# Patient Record
(demographics unavailable — no encounter records)

---

## 2020-12-03 NOTE — NUR
SUICIDAL IDEATION, THINKING OF OVERDOSING ON HEROIN, [place]S TO GO TO [place]. [place]. PATIENT A/OX4, AMBULATORY WITH STEADY GAIT. NO DISTRESS NOTED.

## 2020-12-03 NOTE — NUR
PATIENT A/OX4, REFUSED TO BE ADMITTED FOR SUICIDAL IDEATION, DENIES SI/HI AT 
THIS TIME. REFUSING ANY FURTHER TREATMENT. INSISTED ON LEAVING. DR. VIVAR 
SEEN AND RE-EVAL PATIENT. PATIENT ELOPED FACILITY.

## 2021-11-05 NOTE — NUR
SS consult for SI. 

Pt. Is a 60-year-old White male. Pt. demonstrates adequate insight to the 
reason for hospitalization. Per pt., he walked to the hospital due to having 
thoughts of SI.

Pt. was oriented x3, alert, and cooperative. During interview, pt. was capable 
of following directions, made appropriate eye-contact, but was not able to sit 
still.

COLEMAN explored pt.'s Hx of mental health and substance abuse. Pt. reported Hx of 
depression and anxiety in the past. Pt. denies HI, AH/VH, paranoia or 
delusions. Per pt., he has been suicidal for years. Per pt., he attempted 
suicide about 20 years ago by OD on Methamphetamine and cutting wrist. Per pt., 
he does not use Methamphetamine often. COLEMAN explored pt.'s living situation. Per 
pt., he has been homeless for couple months and lives on the streets. COLEMAN 
explored pt.'s financial status. Per pt., he is currently unemployed but 
receives SSI. Per pt., he reports having no adequate support, but pt. listed a 
friend as an emergency contact [Sherry [phone]]. Per pt., he wants a 
referral to [facility]. COLEMAN faxed clinical paperwork over to [facility] [Fax 
number: [phone]]. COLEMAN will be notified ED nurse once there is a bed 
available. Pt. was willing to sign Homeless Waiver. 



Plan: COLEMAN provided homeless resources and pt. accepted. Pt. denied the substance 
abuse resources, per pt., he does not have an addiction. COLEMAN faxed clinical 
paperwork to [facility] [[facility] Fax number: [phone]].  



Resources Provided:



Year-round shelters: [facility] [address] [place] [address] [phone]; [facility] [address] [place] [address]; 
[id number] [facility] [address] [phone]

Winter Shelters: 

Eleno Knott

Provider: [facility]

[facility]

# of Beds: 47

Phone Number: [phone]

Population Served: [facility] 6 | [place]

Provider: [facility]

Address: [address] [place] [address], [address] 

# of Beds: 66

Phone Number: [phone]

Population Served: [facility]

 

Tiarra Alvada

Provider: [facility]

[address] [address] [address] [address]

# of Beds: 56

Phone Number: [phone]

Population Served: [facility]

 

Elton Knott 

Provider: [facility]

Address: [address] [facility], [address]

# of Beds: 49

Phone Number: [phone]

Population Served: [place]

 

[facility] | [place]

[place]

Provider: [facility]

Address: [address] [facility], [address]

# of Beds: 37

Phone Number: [phone]

Population Served: [place]



Hygiene: [facility] [address] [facility] [phone]; [facility] [address] [facility] [phone]; [facility] [address], [place] [phone].

Food Resources: [facility] [address] [facility]; [facility] ([facility]) [address] [facility]; [facility] [address] [facility]; 
[facility] [address] [facility]. 

Mental Health resources provided: [facility] [address] [place] [address] [phone]; [facility] [address] 
[facility] [address] [place] [address] [phone]; [facility] 
[facility] [address] [place] [address] [phone]; [facility] [address] [place], [place] 
[id number] [phone]

Healthcare Clinics: [facility] [address] [facility], [address] [place]. [place] [phone]; [facility] [address] 
[facility] [address] [place] [address]; [facility] [address] [facility] [address] [phone]

## 2021-11-05 NOTE — NUR
Pt. was requesting a referral to [facility]. COLEMAN faxed clinical paperwork 
over to [facility] [Fax number: [phone]]. COLEMAN will update nurse soon.

## 2021-11-05 NOTE — NUR
BIBS WITH SI WITH PLAN TO OD ON HEROIN. WANTS TO GO TO [place] [facility]. ALERT AND 
ORIENTED X4. DENIES PAIN. IN ROOM AIR AND DENIES SOB. RESPIRATION REGULAR AND 
UNLABORED. WILL CONTNUE TO MONITOR THE PATIENT.